# Patient Record
Sex: FEMALE | Race: WHITE | Employment: OTHER | URBAN - METROPOLITAN AREA
[De-identification: names, ages, dates, MRNs, and addresses within clinical notes are randomized per-mention and may not be internally consistent; named-entity substitution may affect disease eponyms.]

---

## 2017-10-23 ENCOUNTER — THERAPY VISIT (OUTPATIENT)
Dept: PHYSICAL THERAPY | Facility: CLINIC | Age: 77
End: 2017-10-23
Payer: MEDICARE

## 2017-10-23 DIAGNOSIS — M25.551 HIP PAIN, RIGHT: Primary | ICD-10-CM

## 2017-10-23 PROCEDURE — 97161 PT EVAL LOW COMPLEX 20 MIN: CPT | Mod: GP | Performed by: PHYSICAL THERAPIST

## 2017-10-23 PROCEDURE — G8981 BODY POS CURRENT STATUS: HCPCS | Mod: GP | Performed by: PHYSICAL THERAPIST

## 2017-10-23 PROCEDURE — 97110 THERAPEUTIC EXERCISES: CPT | Mod: GP | Performed by: PHYSICAL THERAPIST

## 2017-10-23 PROCEDURE — G8982 BODY POS GOAL STATUS: HCPCS | Mod: GP | Performed by: PHYSICAL THERAPIST

## 2017-10-23 PROCEDURE — 97112 NEUROMUSCULAR REEDUCATION: CPT | Mod: GP | Performed by: PHYSICAL THERAPIST

## 2017-10-23 NOTE — PROGRESS NOTES
[facility] Initial Evaluation -- Lower Extremity    Evaluation Date: October 23, 2017  Tamela Mora is a 76 year old female with a (R) hip condition.   Referral: GP  Work mechanical stresses: NA   Employment status: Retired  Leisure mechanical stresses: Walking (daily), gym (5-6 x week), Has not done either the past couple weeks  Functional disability score: NA  VAS score (0-10): 5/10  Patient goals/expectations:  Reduce pain and allow return to exercise activities.    HISTORY:    Present symptoms: (R) posterior hip/glut, posterior thigh  Pain quality (sharp/shooting/stabbing/aching/burning/cramping):  dull    Present since (onset date): Several months.  MD referral date 10.18.17   Symptoms (improving/unchaning/worsening):  unchanging.      Symptoms commenced as a result of: No apparent reason   Condition occurred in the following environment: NA     Symptoms at onset: (R) posterior hip  Paresthesia (yes/no):  No  Spinal history: No     Cough/Sneeze (pos/neg):  Neg    Constant symptoms: None  Intermittent symptoms: As above    Symptoms are worse with the following: Sometimes Bending, Always Sitting, Sometimes Rising, Sometimes Walking (prolonged), Sometimes Stairs, Always Sleeping (prone/sup/side R/L) - Sides and Time of day - Always PM   Symptoms are better with the following: Sometimes walking    Continued use makes the pain (better/worse/no effect): Worse    Disturbed night (yes/no): Yes, 1 x night      Pain at rest (yes/no):  Yes  Site (back/hip/knee/ankle/foot):  (R) posterior hip    Other questions (swelling/clicking/locking/giving way/falling):  None     Previous episodes: No  Previous treatments: None    Specific Questions:  General health (excellent/good/fair/poor):  Good  Pertinent medical history includes: Cancer, High blood pressure and Thyroid problems  Medications (nil/NSAIDS/analg/steroids/anticoag/other):  Other - Thyroid and High blood pressure  Medical allergies:   None  Imaging (none/Xray/MRI/other):  X-ray (Hip DJD)  Recent or major surgery (yes/no):  (B) Thumb, Thyroidectomy  Night pain (yes/no):  No  Accidents (yes/no):  No  Unexplained weight loss (yes/no):  No  Barriers at home: None  Other red flags: None    Sites for physical examination (back/hip/knee/ankle/foot/other): Back, Hip    EXAMINATION    Posture:  Sitting (good/fair/poor): Fair    Correction of Posture (better/worse/no effect/NA): No effect  Standing (good/fair/poor): Fair  Other observations:  NA    Neurological: (NA/motor/sensory/reflexes/dural): Neg slump (B)    Baselines (pain or functional activity): Painful sitting, lunging, prolonged walking, going up stairs    Extremities (Hip / Knee / Ankle / Foot): Hip    Movement Loss Angel Mod Min Nil Pain   NT due to assessment of L/S          Passive Movement (+/- over pressure)/(PDM/ERP):  NT  Resisted Test Response (pain): NT  Other Tests: NT    Spine:  Movement loss: Flex WNL; Ext Mod-Angel loss, ERP (R) ant hip; SG Mod loss (B), ERP (B)  Effect of repeated movements: FIS - NE, NE, NE.  FISit - NE, NE, Pain worse with lunge after, NE ROM.  EIS - Produce ant thigh, NW, NE  Effect of static positioning: NT  Spine testing (not relevant/relevant/secondary problem): Relevant, assessing Ext with time to further confirm.    Baseline Symptoms: NT  Repeated Tests Symptom Response Mechanical Response   Active/Passive movement, resisted test, functional test During -  Produce, Abolish, Increase, Decrease, NE After -  Better, Worse, NB, NW, NE Effect -   ? or ? ROM, strength or key functional test No   Effect   NT due to testing lumbar spine       Effect of static positioning       NT         Provisional Classification (Extremity/Spine):  Spine - Inconclusive/Other - Testing repeated EIS over the next week.      Princicple of Management:   Education:  Spine v Hip involvement, specificity of exercise, posture    Equipment provided:  Lumbar roll  Exercise and dosage:  EIS x  10-15 reps every 2 hrs    ASSESSMENT/PLAN:    Patient is a 76 year old female with right side hip complaints.    Patient has the following significant findings with corresponding treatment plan.                Diagnosis 1:  (R) Hip Pain with lumbar component    Pain -  hot/cold therapy, self management, education, directional preference exercise and home program  Decreased ROM/flexibility - manual therapy, therapeutic exercise and home program  Decreased joint mobility - manual therapy, therapeutic exercise and home program  Decreased strength - therapeutic exercise, therapeutic activities and home program  Impaired gait - gait training and home program  Impaired muscle performance - neuro re-education and home program  Decreased function - therapeutic activities and home program  Impaired posture - neuro re-education and home program    Therapy Evaluation Codes:   1) History comprised of:   Personal factors that impact the plan of care:      None.    Comorbidity factors that impact the plan of care are:      High blood pressure.     Medications impacting care: High blood pressure.  2) Examination of Body Systems comprised of:   Body structures and functions that impact the plan of care:      Lumbar spine.   Activity limitations that impact the plan of care are:      Bending, Sitting, Squatting/kneeling, Stairs and Sleeping.  3) Clinical presentation characteristics are:   Stable/Uncomplicated.  4) Decision-Making    Low complexity using standardized patient assessment instrument and/or measureable assessment of functional outcome.  Cumulative Therapy Evaluation is: Low complexity.    Previous and current functional limitations:  (See Goal Flow Sheet for this information)    Short term and Long term goals: (See Goal Flow Sheet for this information)     Communication ability:  Patient appears to be able to clearly communicate and understand verbal and written communication and follow directions correctly.  Treatment  Explanation - The following has been discussed with the patient:   RX ordered/plan of care  Anticipated outcomes  Possible risks and side effects  This patient would benefit from PT intervention to resume normal activities.   Rehab potential is good.    Frequency:  1 X week, once daily  Duration:  for 6 weeks  Discharge Plan:  Achieve all LTG.  Independent in home treatment program.  Reach maximal therapeutic benefit.    Please refer to the daily flowsheet for treatment today, total treatment time and time spent performing 1:1 timed codes.

## 2017-10-23 NOTE — LETTER
DEPARTMENT OF HEALTH AND HUMAN SERVICES  [facility] FOR [facility] & MEDICAID SERVICES    PLAN/UPDATED PLAN OF PROGRESS FOR OUTPATIENT REHABILITATION    PATIENTS NAME:  Tamela Mora   : 1940    PROVIDER NUMBER:    [id number]  [facility]  [id number]    PROVIDER NAME: [facility]  MEDICAL RECORD NUMBER: [id number]     START OF CARE DATE:  SOC Date: 10/23/17   TYPE:  PT    PRIMARY/TREATMENT DIAGNOSIS: (Pertinent Medical Diagnosis)  Hip pain, right    VISITS FROM START OF CARE:    1     [facility] Initial Evaluation -- Lower Extremity  Evaluation Date: 2017  Tamela Mora is a 76 year old female with a (R) hip condition.   Referral: GP  Work mechanical stresses: NA   Employment status: Retired  Leisure mechanical stresses: Walking (daily), gym (5-6 x week), Has not done either the past couple weeks  Functional disability score: NA  VAS score (0-10): 5/10  Patient goals/expectations:  Reduce pain and allow return to exercise activities.    HISTORY:  Present symptoms: (R) posterior hip/glut, posterior thigh  Pain quality (sharp/shooting/stabbing/aching/burning/cramping):  dull  Present since (onset date): Several months.  MD referral date 10.18.17   Symptoms (improving/unchaning/worsening):  unchanging.    Symptoms commenced as a result of: No apparent reason   Condition occurred in the following environment: NA   Symptoms at onset: (R) posterior hip    Paresthesia (yes/no):  No  Spinal history: No       Cough/Sneeze (pos/neg):  Neg  Constant symptoms: None  Intermittent symptoms: As above  Symptoms are worse with the following: Sometimes Bending, Always Sitting, Sometimes Rising, Sometimes Walking (prolonged), Sometimes Stairs, Always Sleeping (prone/sup/side R/L) - Sides and Time of day - Always PM   Symptoms are better with the following: Sometimes walking  Continued use makes the pain (better/worse/no effect): Worse  Disturbed night  (yes/no): Yes, 1 x night        PATIENTS NAME:  Tamela Mora   : 1940  PRIMARY/TREATMENT DIAGNOSIS: (Pertinent Medical Diagnosis)  Hip pain, right    Pain at rest (yes/no):  Yes    Site (back/hip/knee/ankle/foot):  (R) posterior hip  Other questions (swelling/clicking/locking/giving way/falling):  None  Previous episodes: No  Previous treatments: None    Specific Questions:  General health (excellent/good/fair/poor):  Good  Pertinent medical history includes: Cancer, High blood pressure and Thyroid problems  Medications (nil/NSAIDS/analg/steroids/anticoag/other):  Other - Thyroid and High blood pressure  Medical allergies:  None  Imaging (none/Xray/MRI/other):  X-ray (Hip DJD)  Recent or major surgery (yes/no):  (B) Thumb, Thyroidectomy  Night pain (yes/no):  No  Accidents (yes/no):  No  Unexplained weight loss (yes/no):  No  Barriers at home: None  Other red flags: None    Sites for physical examination (back/hip/knee/ankle/foot/other): Back, Hip    EXAMINATION    Posture:  Sitting (good/fair/poor): Fair    Correction of Posture (better/worse/no effect/NA): No effect  Standing (good/fair/poor): Fair  Other observations:  NA    Neurological: (NA/motor/sensory/reflexes/dural): Neg slump (B)    Baselines (pain or functional activity): Painful sitting, lunging, prolonged walking, going up stairs    Extremities (Hip / Knee / Ankle / Foot): Hip    Movement Loss Angel Mod Min Nil Pain   NT due to assessment of L/S          Passive Movement (+/- over pressure)/(PDM/ERP):  NT  Resisted Test Response (pain): NT  Other Tests: NT      PATIENTS NAME:  Tamela Mora   : 1940  PRIMARY/TREATMENT DIAGNOSIS: (Pertinent Medical Diagnosis)  Hip pain, right    Spine:  Movement loss: Flex WNL; Ext Mod-Angel loss, ERP (R) ant hip; SG Mod loss (B), ERP (B)  Effect of repeated movements: FIS - NE, NE, NE.  FISit - NE, NE, Pain worse with lunge after, NE ROM.  EIS - Produce ant thigh, NW, NE  Effect of static positioning:  NT  Spine testing (not relevant/relevant/secondary problem): Relevant, assessing Ext with time to further confirm.    Baseline Symptoms: NT  Repeated Tests Symptom Response Mechanical Response   Active/Passive movement, resisted test, functional test During -  Produce, Abolish, Increase, Decrease, NE After -  Better, Worse, NB, NW, NE Effect -   ? or ? ROM, strength or key functional test No   Effect   NT due to testing lumbar spine       Effect of static positioning       NT         Provisional Classification (Extremity/Spine):  Spine - Inconclusive/Other - Testing repeated EIS over the next week.    Princicple of Management:   Education:  Spine v Hip involvement, specificity of exercise, posture    Equipment provided:  Lumbar roll  Exercise and dosage:  EIS x 10-15 reps every 2 hrs    ASSESSMENT/PLAN:  Patient is a 76 year old female with right side hip complaints.    Patient has the following significant findings with corresponding treatment plan.                Diagnosis 1:  (R) Hip Pain with lumbar component  Pain -  hot/cold therapy, self management, education, directional preference exercise and home program  Decreased ROM/flexibility - manual therapy, therapeutic exercise and home program  Decreased joint mobility - manual therapy, therapeutic exercise and home program  Decreased strength - therapeutic exercise, therapeutic activities and home program  Impaired gait - gait training and home program  Impaired muscle performance - neuro re-education and home program  Decreased function - therapeutic activities and home program  Impaired posture - neuro re-education and home program          PATIENTS NAME:  Tamela Mora   : 1940  PRIMARY/TREATMENT DIAGNOSIS: (Pertinent Medical Diagnosis)  Hip pain, right    Therapy Evaluation Codes:   1) History comprised of:   Personal factors that impact the plan of care:      None.    Comorbidity factors that impact the plan of care are:      High blood pressure.      Medications impacting care: High blood pressure.  2) Examination of Body Systems comprised of:   Body structures and functions that impact the plan of care:      Lumbar spine.   Activity limitations that impact the plan of care are:      Bending, Sitting, Squatting/kneeling, Stairs and Sleeping.  3) Clinical presentation characteristics are:   Stable/Uncomplicated.  4) Decision-Making    Low complexity using standardized patient assessment instrument and/or measureable assessment of functional outcome.  Cumulative Therapy Evaluation is: Low complexity.    Previous and current functional limitations:  (See Goal Flow Sheet for this information)    Short term and Long term goals: (See Goal Flow Sheet for this information)   Communication ability:  Patient appears to be able to clearly communicate and understand verbal and written communication and follow directions correctly.  Treatment Explanation - The following has been discussed with the patient:   RX ordered/plan of care, Anticipated outcomes, Possible risks and side effects                                                PATIENTS NAME:  Tamela Mora   : 1940  PRIMARY/TREATMENT DIAGNOSIS: (Pertinent Medical Diagnosis)  Hip pain, right    This patient would benefit from PT intervention to resume normal activities.   Rehab potential is good.  Frequency:  1 X week, once daily  Duration:  for 6 weeks  Discharge Plan:  Achieve all LTG.  Independent in home treatment program.  Reach maximal therapeutic benefit.        Caregiver Signature/Credentials _____________________________ Date ________         Leandro Gonzalez DPT, Cert MDT     I have reviewed and certified the need for these services and plan of treatment while under my care.        PHYSICIAN'S SIGNATURE:   _________________________________________    Date___________   Elyssa Smith PA-C    Certification period:  Beginning of Cert date period: 10/23/17 to  18     Functional Level Progress Report:  "Please see attached \"Goal Flow sheet for Functional level.\"    ____X____ Continue Services or       ________ DC Services                Service dates: From  SOC Date: 10/23/17  to present                         "

## 2017-10-24 PROBLEM — M25.551 HIP PAIN, RIGHT: Status: ACTIVE | Noted: 2017-10-24

## 2017-10-31 ENCOUNTER — THERAPY VISIT (OUTPATIENT)
Dept: PHYSICAL THERAPY | Facility: CLINIC | Age: 77
End: 2017-10-31
Payer: MEDICARE

## 2017-10-31 DIAGNOSIS — M25.551 HIP PAIN, RIGHT: ICD-10-CM

## 2017-10-31 PROCEDURE — 97110 THERAPEUTIC EXERCISES: CPT | Mod: GP | Performed by: PHYSICAL THERAPIST

## 2017-11-07 ENCOUNTER — THERAPY VISIT (OUTPATIENT)
Dept: PHYSICAL THERAPY | Facility: CLINIC | Age: 77
End: 2017-11-07
Payer: MEDICARE

## 2017-11-07 DIAGNOSIS — M25.551 HIP PAIN, RIGHT: ICD-10-CM

## 2017-11-07 PROCEDURE — 97110 THERAPEUTIC EXERCISES: CPT | Mod: GP | Performed by: PHYSICAL THERAPIST

## 2017-11-15 ENCOUNTER — THERAPY VISIT (OUTPATIENT)
Dept: PHYSICAL THERAPY | Facility: CLINIC | Age: 77
End: 2017-11-15
Payer: MEDICARE

## 2017-11-15 DIAGNOSIS — M25.551 HIP PAIN, RIGHT: ICD-10-CM

## 2017-11-15 PROCEDURE — 97110 THERAPEUTIC EXERCISES: CPT | Mod: GP | Performed by: PHYSICAL THERAPIST

## 2017-11-29 ENCOUNTER — THERAPY VISIT (OUTPATIENT)
Dept: PHYSICAL THERAPY | Facility: CLINIC | Age: 77
End: 2017-11-29
Payer: MEDICARE

## 2017-11-29 DIAGNOSIS — M25.551 HIP PAIN, RIGHT: ICD-10-CM

## 2017-11-29 PROCEDURE — G8982 BODY POS GOAL STATUS: HCPCS | Mod: GP | Performed by: PHYSICAL THERAPIST

## 2017-11-29 PROCEDURE — 97110 THERAPEUTIC EXERCISES: CPT | Mod: GP | Performed by: PHYSICAL THERAPIST

## 2017-11-29 PROCEDURE — G8983 BODY POS D/C STATUS: HCPCS | Mod: GP | Performed by: PHYSICAL THERAPIST

## 2017-11-29 NOTE — PROGRESS NOTES
Subjective:    HPI                    Objective:    System    Physical Exam    General     ROS    Assessment/Plan:      DISCHARGE REPORT    Progress reporting period is from 10.23.2017 to 11.29.2017.       SUBJECTIVE  Subjective: Pt reports she is compliant with prone lying in extension, EIS and hip Ext has been less often. Patient is woken up ~3x/week because of (R) hip pain. Pt is able to fall back asleep with reposition and ibprofen.    Current Pain level: 0/10.     Initial Pain level: 5/10.   Changes in function:  Yes (See Goal flowsheet attached for changes in current functional level)  Adverse reaction to treatment or activity: None    OBJECTIVE  Objective: ROM: Flex: 25% loss, Ext: 25% loss. L SG 0% loss, Stretching feeling in (R) Lat Hip. (R) SG 0% loss     ASSESSMENT/PLAN  Updated problem list and treatment plan: Diagnosis 1:  (R) Hip pain with lumbar component  Decreased ROM/flexibility - home program  Decreased joint mobility - home program  Decreased function - home program  STG/LTGs have been met or progress has been made towards goals:  Yes (See Goal flow sheet completed today.)  Assessment of Progress: The patient's condition is improving.  Patient is meeting short term goals and is progressing towards long term goals.  Self Management Plans:  Patient is independent in a home treatment program.  Patient is independent in self management of symptoms.  I have re-evaluated this patient and find that the nature, scope, duration and intensity of the therapy is appropriate for the medical condition of the patient.  Tamela continues to require the following intervention to meet STG and LTG's:  PT    Recommendations:  This patient is ready to be discharged from therapy and continue their home treatment program.    Please refer to the daily flowsheet for treatment today, total treatment time and time spent performing 1:1 timed codes.

## 2017-11-29 NOTE — LETTER
[facility]IC [facility] PHYSICAL THERAPY  [address]  [facility] [address]  [phone]    2017    Re: Tamela Mora   :   1940  MRN:  [id number]   REFERRING PHYSICIAN:   Elyssa Smith    [facility] PHYSICAL THERAPY    Date of Initial Evaluation:  10/23/2017  Visits:    5  Reason for Referral:  Hip pain, right    DISCHARGE REPORT:  Progress reporting period is from 10.23.2017 to 2017.      SUBJECTIVE  Subjective: Pt reports she is compliant with prone lying in extension, EIS and hip Ext has been less often. Patient is woken up ~3x/week because of (R) hip pain. Pt is able to fall back asleep with reposition and ibprofen.    Current Pain level: 0/10.     Initial Pain level: 5/10.   Changes in function:  Yes (See Goal flowsheet attached for changes in current functional level)  Adverse reaction to treatment or activity: None    OBJECTIVE  Objective: ROM: Flex: 25% loss, Ext: 25% loss. L SG 0% loss, Stretching feeling in (R) Lat Hip. (R) SG 0% loss     ASSESSMENT/PLAN  Updated problem list and treatment plan: Diagnosis 1:  (R) Hip pain with lumbar component  Decreased ROM/flexibility - home program  Decreased joint mobility - home program  Decreased function - home program  STG/LTGs have been met or progress has been made towards goals:  Yes (See Goal flow sheet completed today.)  Assessment of Progress: The patient's condition is improving.  Patient is meeting short term goals and is progressing towards long term goals.  Self Management Plans:  Patient is independent in a home treatment program.  Patient is independent in self management of symptoms.  I have re-evaluated this patient and find that the nature, scope, duration and intensity of the therapy is appropriate for the medical condition of the patient.  Tamela continues to require the following intervention to meet STG and LTG's:  PT              Re: Tamela Mora    :   1940    Recommendations:  This patient is ready to be discharged from therapy and continue their home treatment program.    Thank you for your referral.    INQUIRIES        Therapist:  Leandro Gonzalez DPT, cert MDT  [facility] PH[facility]ERAP[facility]  [address] [facility] [address]  [facility] [address]  Phone: [phone]  Fax: [phone]

## 2017-12-18 ENCOUNTER — THERAPY VISIT (OUTPATIENT)
Dept: PHYSICAL THERAPY | Facility: CLINIC | Age: 77
End: 2017-12-18
Payer: MEDICARE

## 2017-12-18 DIAGNOSIS — M25.551 HIP PAIN, RIGHT: ICD-10-CM

## 2017-12-18 PROCEDURE — G8982 BODY POS GOAL STATUS: HCPCS | Mod: GP | Performed by: PHYSICAL THERAPIST

## 2017-12-18 PROCEDURE — G8983 BODY POS D/C STATUS: HCPCS | Mod: GP | Performed by: PHYSICAL THERAPIST

## 2017-12-18 PROCEDURE — 97110 THERAPEUTIC EXERCISES: CPT | Mod: GP | Performed by: PHYSICAL THERAPIST

## 2017-12-18 NOTE — LETTER
[facility] ATHLETIC [facility] PHYSICAL THERAPY  [address]  [facility] [address]  [phone]    2017    Re: Tamela Mora   :   1940  MRN:  [id number]   REFERRING PHYSICIAN:   Elyssa Smith    [facility]THLET[facility] PHYSICAL THERAPY    Date of Initial Evaluation:  10/23/2017  Visits:   6  Reason for Referral:  Hip pain, right    PROGRESS  REPORT:  Progress reporting period is from 17 to 17.       SUBJECTIVE  Subjective changes noted by patient:  Pt returns to PT stating she is continuing to have pain in (R) ant/lat hip with prolonged sitting, sometimes walking, and going up/down stairs.  Has been getting a little worse the past few weeks but relates she has not been as consistent with standing extension stretch as she was early on.  Also reports the  at the club suggested to check her hip?.    Current Pain level: 0/10.   Initial Pain level: 5/10.   Changes in function:  Yes (See Goal flowsheet attached for changes in current functional level).  Adverse reaction to treatment or activity: None    OBJECTIVE  L/S AROM:  Flex WNL; Ext Min-Mod loss; SG min loss (B)/ERP.  PROM Hip:  (R) Flex min loss/ERP; ER Min loss/ERP; IR Min loss/ERP, Ext mod loss/ERP     ASSESSMENT/PLAN  Updated problem list and treatment plan: Diagnosis 1:  (R) Hip Pain  Pain -  self management, education, directional preference exercise and home program  Decreased ROM/flexibility - therapeutic exercise and home program  Decreased joint mobility - therapeutic exercise and home program  Impaired muscle performance - neuro re-education and home program  Decreased function - therapeutic activities and home program  STG/LTGs have been met or progress has been made towards goals:  Yes (See Goal flow sheet completed today.)  Assessment of Progress: The patient's progress has slowed.  Self Management Plans:  Patient has been instructed in a home treatment  program.  Patient  has been instructed in self management of symptoms.  I have re-evaluated this patient and find that the nature, scope, duration and intensity of the therapy is appropriate for the medical condition of the patient.  Tamela continues to require the following intervention to meet STG and LTG's:  PT intervention is no longer required to meet STG/LTG.          Re: Tamela Mora   :   1940    Recommendations:  Continue with HEP as instructed with increased frequency of lumbar EIS over the next 2 weeks.  If no significant change as a result recommend follow up with MD.    Thank you for your referral.    INQUIRIES        Therapist:  Leandro Gonzalez, DPT, cert MDT   [facility]  [address] [facility] [address]  [facility] [address]  Phone: [phone]  Fax: [phone]

## 2017-12-19 NOTE — PROGRESS NOTES
[facility] Evaluation  Subjective:    HPI                    Objective:    System    Physical Exam    General     ROS    Assessment/Plan:      PROGRESS  REPORT    Progress reporting period is from 11.29.17 to 12.19.17.       SUBJECTIVE  Subjective changes noted by patient:  Pt returns to PT stating she is continuing to have pain in (R) ant/lat hip with prolonged sitting, sometimes walking, and going up/down stairs.  Has been getting a little worse the past few weeks but relates she has not been as consistent with standing extension stretch as she was early on.  Also reports the  at the club suggested to check her hip?.    Current Pain level: 0/10.     Initial Pain level: 5/10.   Changes in function:  Yes (See Goal flowsheet attached for changes in current functional level)  Adverse reaction to treatment or activity: None    OBJECTIVE  L/S AROM:  Flex WNL; Ext Min-Mod loss; SG min loss (B)/ERP.  PROM Hip:  (R) Flex min loss/ERP; ER Min loss/ERP; IR Min loss/ERP, Ext mod loss/ERP     ASSESSMENT/PLAN  Updated problem list and treatment plan: Diagnosis 1:  (R) Hip Pain    Pain -  self management, education, directional preference exercise and home program  Decreased ROM/flexibility - therapeutic exercise and home program  Decreased joint mobility - therapeutic exercise and home program  Impaired muscle performance - neuro re-education and home program  Decreased function - therapeutic activities and home program  STG/LTGs have been met or progress has been made towards goals:  Yes (See Goal flow sheet completed today.)  Assessment of Progress: The patient's progress has slowed.  Self Management Plans:  Patient has been instructed in a home treatment program.  Patient  has been instructed in self management of symptoms.  I have re-evaluated this patient and find that the nature, scope, duration and intensity of the therapy is appropriate for the medical condition of the patient.  Tamela  continues to require the following intervention to meet STG and LTG's:  PT intervention is no longer required to meet STG/LTG.    Recommendations:  Continue with HEP as instructed with increased frequency of lumbar EIS over the next 2 weeks.  If no significant change as a result recommend follow up with MD.    Please refer to the daily flowsheet for treatment today, total treatment time and time spent performing 1:1 timed codes.

## 2018-02-19 PROBLEM — M25.551 HIP PAIN, RIGHT: Status: RESOLVED | Noted: 2017-10-24 | Resolved: 2018-02-19

## 2018-02-19 NOTE — PROGRESS NOTES
D/C PT.  Patient did not return to PT after last visit.  See progress note below to serve as D/C status.

## 2018-05-09 ENCOUNTER — THERAPY VISIT (OUTPATIENT)
Dept: PHYSICAL THERAPY | Facility: CLINIC | Age: 78
End: 2018-05-09
Payer: MEDICARE

## 2018-05-09 DIAGNOSIS — M25.522 PAIN IN JOINT INVOLVING UPPER ARM, LEFT: Primary | ICD-10-CM

## 2018-05-09 PROCEDURE — 97530 THERAPEUTIC ACTIVITIES: CPT | Mod: GP | Performed by: PHYSICAL THERAPIST

## 2018-05-09 PROCEDURE — G8985 CARRY GOAL STATUS: HCPCS | Mod: GP | Performed by: PHYSICAL THERAPIST

## 2018-05-09 PROCEDURE — 97161 PT EVAL LOW COMPLEX 20 MIN: CPT | Mod: GP | Performed by: PHYSICAL THERAPIST

## 2018-05-09 PROCEDURE — 97110 THERAPEUTIC EXERCISES: CPT | Mod: GP | Performed by: PHYSICAL THERAPIST

## 2018-05-09 PROCEDURE — G8984 CARRY CURRENT STATUS: HCPCS | Mod: GP | Performed by: PHYSICAL THERAPIST

## 2018-05-09 NOTE — LETTER
DEPARTMENT OF HEALTH AND HUMAN SERVICES  CENTERS FOR MEDICARE & MEDICAID SERVICES    PLAN/UPDATED PLAN OF PROGRESS FOR OUTPATIENT REHABILITATION    PATIENTS NAME:  Tamela Mora   : 1940    PROVIDER NUMBER:    [id number]  [id number]     PROVIDER NAME: [facility]  MEDICAL RECORD NUMBER: [id number]     START OF CARE DATE:  SOC Date: 18   TYPE:  PT    PRIMARY/TREATMENT DIAGNOSIS: (Pertinent Medical Diagnosis)  Pain in joint involving upper arm, left  VISITS FROM START OF CARE:   1   [facility] Initial Evaluation -- Upper Extremity  Evaluation Date: May 9, 2018  Tamela Mora is a 77 year old female with a (L) arm condition.   Referral: GP  Work mechanical stresses: NA  Employment status:  Retired  Leisure mechanical stresses: No formal exercise  Functional disability score (SPADI): Pt did not complete  VAS score (0-10): 3/10  Handedness (R/L):  Right  Patient goals/expectations:  What's causing pain and how to fix it    HISTORY  Present symptoms: (L) lateral arm to elbow.    Pain quality (sharp/shooting/stabbing/aching/burning/cramping):  burning  Present since (onset date):  5-6 weeks.  MD referral date 18.    Symptoms (improving/unchanging/worsening):  unchanging.  Symptoms commenced as a result of: No apparent reason   Condition occurred in the following environment: Home  Symptoms at onset: As above    Paresthesia (yes/no):  No  Spinal history: No     Cough/Sneeze (pos/neg):  Neg  Constant symptoms: As above  Intermittent symptoms: None  Symptoms are worse with the following: Sometimes Dressing, Always Reaching, Always Sleeping (prone/sup/side R/L) - right side and Time of day - No effect   Symptoms are better with the following: Other - ibuprofen  Continued use makes the pain (better/worse/no effect): worse  Disturbed night (yes/no):  Yes, sometimes  Pain at rest (yes/no): Yes    Site  (neck/shoulder/elbow/wrist/hand): shoulder/arm  Other questions (swelling/catching/clicking/locking/subluxing):  None  Previous episodes: No  Previous treatments: None  PATIENTS NAME:  Tamela Mora   : 1940  PRIMARY/TREATMENT DIAGNOSIS: (Pertinent Medical Diagnosis)  Pain in joint involving upper arm, left    Specific Questions:  General health (excellent/good/fair/poor):  Good  Pertinent medical history includes: High blood pressure and Thyroid problems  Medications (nil/NSAIDS/analg/steroids/anticoag/other):  Other - Thyroid  Medical allergies:  None  Imaging (None/Xray/MRI/Other): X-rays   Recent or major surgery (yes/no): Thyroidectomy  Night pain (yes/no): No  Accidents (yes/no): No  Unexplained weight loss (yes/no): No  Barriers at home: None  Other red flags: None    Sites for physical examination (neck/shoulder/elbow/wrist/hand): neck/shoulder    EXAMINATION    Posture:  Sitting (good/fair/poor): Poor  Correction of posture (better/worse/no effect/NA): Better  Standing (good/fair/poor): Fair  Other observations:  NT  Neurological (NA/motor/sensory/reflexes/dural): NT  Baselines (pain or functional activity): Painful reaching behind back, out to side    Extremities (Shoulder/Elbow/Wrist/Hand): Shoulder    Movement Loss Angel Mod Min Nil Pain   Flexion    X    Extension    X    Abduction    X PDM   Internal Rotation    X PDM   External Rotation    X       Passive Movement (+/- overpressure)/(PDM/ERP):  NT  Resisted Test Response (pain): FF 4/5, painful; Abd 4/5, painful; IR 5/5; ER 5/5  Other Tests: NT    Spine:  Movement Loss: Flex WNL; Ext mod loss; Rotation Min loss (B).  Effect of repeated movements: Retr - NE, NE, NE; Retr/Ext - Produce BON, NW, Dec pain Abd, Improved FF/Abd strength  Effect of static positioning: NT  Spine testing (not relevant/relevant/secondary problem): Relevant      PATIENTS NAME:  Tamela Mora   : 1940  PRIMARY/TREATMENT DIAGNOSIS: (Pertinent Medical  Diagnosis)  Pain in joint involving upper arm, left    Baseline Symptoms: NT  Repeated Tests Symptom Response Mechanical Response   Active/Passive movement, resisted test, functional test During - Produce, Abolish, Increase, Decrease, NE After -   Better, Worse, NB, NW, NE Effect -   ? or ? ROM, strength or key functional test No Effect   NT due to response with repeated cervical movements       Effect of static positioning       NT         Provisional Classification (Extremity/Spine): Spine - Derangement - Asymmetrical, unilateral, symptoms above elbow      Principle of Management:  Education:  Posture, spine as source of arm sx's, centralization, specificity of exercise  Equipment provided:  None.  Use of rolled towel for posture correction  Exercise and dosage:  Retr/Ext x 10-15 reps every 2 hrs    ASSESSMENT/PLAN:  Patient is a 77 year old female with left side shoulder complaints.    Patient has the following significant findings with corresponding treatment plan.                Diagnosis 1:  (L) Arm Pain with cervical component  Pain -  self management, education, directional preference exercise and home program  Decreased strength - therapeutic exercise, therapeutic activities and home program  Impaired muscle performance - neuro re-education and home program  Decreased function - therapeutic activities and home program  Impaired posture - neuro re-education and home program                          PATIENTS NAME:  Tamela Mora   : 1940  PRIMARY/TREATMENT DIAGNOSIS: (Pertinent Medical Diagnosis)  Pain in joint involving upper arm, left    Therapy Evaluation Codes:   1) History comprised of:   Personal factors that impact the plan of care:      None.    Comorbidity factors that impact the plan of care are:      High blood pressure.     Medications impacting care: Thyroid.  2) Examination of Body Systems comprised of:   Body structures and functions that impact the plan of care:      Cervical spine  "and Shoulder.   Activity limitations that impact the plan of care are:      Sleeping and Reaching.  3) Clinical presentation characteristics are:   Stable/Uncomplicated.  4) Decision-Making    Low complexity using standardized patient assessment instrument and/or   measureable assessment of functional outcome.  Cumulative Therapy Evaluation is: Low complexity.    Previous and current functional limitations:  (See Goal Flow Sheet for this information)    Short term and Long term goals: (See Goal Flow Sheet for this information)   Communication ability:  Patient appears to be able to clearly communicate and understand verbal and written communication and follow directions correctly.  Treatment Explanation - The following has been discussed with the patient:   RX ordered/plan of care, Anticipated outcomes, Possible risks and side effects                                                  PATIENTS NAME:  Tamela Mora   : 1940  PRIMARY/TREATMENT DIAGNOSIS: (Pertinent Medical Diagnosis)  Pain in joint involving upper arm, left  This patient would benefit from PT intervention to resume normal activities.   Rehab potential is good.  Frequency:  1 X week, once daily  Duration:  for 6 weeks  Discharge Plan:  Achieve all LTG.  Independent in home treatment program.  Reach maximal therapeutic benefit.              Caregiver Signature/Credentials _____________________________ Date ________          Leandro Gonzalez DPT, Cert MDT     I have reviewed and certified the need for these services and plan of treatment while under my care.        PHYSICIAN'S SIGNATURE:   _________________________________________    Date___________   Elyssa Smith PA-C      Certification period:  Beginning of Cert date period: 18 to  18     Functional Level Progress Report: Please see attached \"Goal Flow sheet for Functional level.\"    ____X____ Continue Services or       ________ DC Services                Service dates: From  SOC Date: " 05/09/18  to present

## 2018-05-09 NOTE — PROGRESS NOTES
[facility] Initial Evaluation -- Upper Extremity    Evaluation Date: May 9, 2018  Tamela Mora is a 77 year old female with a (L) arm condition.   Referral: GP  Work mechanical stresses: NA  Employment status:  Retired  Leisure mechanical stresses: No formal exercise  Functional disability score (SPADI): Pt did not complete  VAS score (0-10): 3/10  Handedness (R/L):  Right  Patient goals/expectations:  What's causing pain and how to fix it    HISTORY    Present symptoms: (L) lateral arm to elbow.    Pain quality (sharp/shooting/stabbing/aching/burning/cramping):  burning    Present since (onset date):  5-6 weeks.  MD referral date 4/27/18.    Symptoms (improving/unchanging/worsening):  unchanging.    Symptoms commenced as a result of: No apparent reason   Condition occurred in the following environment: Home    Symptoms at onset: As above  Paresthesia (yes/no):  No  Spinal history: No   Cough/Sneeze (pos/neg):  Neg    Constant symptoms: As above  Intermittent symptoms: None    Symptoms are worse with the following: Sometimes Dressing, Always Reaching, Always Sleeping (prone/sup/side R/L) - right side and Time of day - No effect   Symptoms are better with the following: Other - ibuprofen    Continued use makes the pain (better/worse/no effect): worse    Disturbed night (yes/no):  Yes, sometimes    Pain at rest (yes/no): Yes  Site (neck/shoulder/elbow/wrist/hand): shoulder/arm    Other questions (swelling/catching/clicking/locking/subluxing):  None    Previous episodes: No  Previous treatments: None    Specific Questions:  General health (excellent/good/fair/poor):  Good  Pertinent medical history includes: High blood pressure and Thyroid problems  Medications (nil/NSAIDS/analg/steroids/anticoag/other):  Other - Thyroid  Medical allergies:  None  Imaging (None/Xray/MRI/Other): X-rays   Recent or major surgery (yes/no): Thyroidectomy  Night pain (yes/no): No  Accidents (yes/no): No  Unexplained  weight loss (yes/no): No  Barriers at home: None  Other red flags: None    Sites for physical examination (neck/shoulder/elbow/wrist/hand): neck/shoulder    EXAMINATION    Posture:  Sitting (good/fair/poor): Poor  Correction of posture (better/worse/no effect/NA): Better  Standing (good/fair/poor): Fair  Other observations:  NT    Neurological (NA/motor/sensory/reflexes/dural): NT    Baselines (pain or functional activity): Painful reaching behind back, out to side    Extremities (Shoulder/Elbow/Wrist/Hand): Shoulder    Movement Loss Angel Mod Min Nil Pain   Flexion    X    Extension    X    Abduction    X PDM   Internal Rotation    X PDM   External Rotation    X       Passive Movement (+/- overpressure)/(PDM/ERP):  NT  Resisted Test Response (pain): FF 4/5, painful; Abd 4/5, painful; IR 5/5; ER 5/5  Other Tests: NT    Spine:  Movement Loss: Flex WNL; Ext mod loss; Rotation Min loss (B).  Effect of repeated movements: Retr - NE, NE, NE; Retr/Ext - Produce BON, NW, Dec pain Abd, Improved FF/Abd strength  Effect of static positioning: NT  Spine testing (not relevant/relevant/secondary problem): Relevant    Baseline Symptoms: NT  Repeated Tests Symptom Response Mechanical Response   Active/Passive movement, resisted test, functional test During - Produce, Abolish, Increase, Decrease, NE After -   Better, Worse, NB, NW, NE Effect -   ? or ? ROM, strength or key functional test No Effect   NT due to response with repeated cervical movements       Effect of static positioning       NT           Provisional Classification (Extremity/Spine): Spine - Derangement - Asymmetrical, unilateral, symptoms above elbow      Principle of Management:  Education:  Posture, spine as source of arm sx's, centralization, specificity of exercise  Equipment provided:  None.  Use of rolled towel for posture correction  Exercise and dosage:  Retr/Ext x 10-15 reps every 2 hrs    ASSESSMENT/PLAN:    Patient is a 77 year old female with left side  shoulder complaints.    Patient has the following significant findings with corresponding treatment plan.                Diagnosis 1:  (L) Arm Pain with cervical component    Pain -  self management, education, directional preference exercise and home program  Decreased strength - therapeutic exercise, therapeutic activities and home program  Impaired muscle performance - neuro re-education and home program  Decreased function - therapeutic activities and home program  Impaired posture - neuro re-education and home program    Therapy Evaluation Codes:   1) History comprised of:   Personal factors that impact the plan of care:      None.    Comorbidity factors that impact the plan of care are:      High blood pressure.     Medications impacting care: Thyroid.  2) Examination of Body Systems comprised of:   Body structures and functions that impact the plan of care:      Cervical spine and Shoulder.   Activity limitations that impact the plan of care are:      Sleeping and Reaching.  3) Clinical presentation characteristics are:   Stable/Uncomplicated.  4) Decision-Making    Low complexity using standardized patient assessment instrument and/or measureable assessment of functional outcome.  Cumulative Therapy Evaluation is: Low complexity.    Previous and current functional limitations:  (See Goal Flow Sheet for this information)    Short term and Long term goals: (See Goal Flow Sheet for this information)     Communication ability:  Patient appears to be able to clearly communicate and understand verbal and written communication and follow directions correctly.  Treatment Explanation - The following has been discussed with the patient:   RX ordered/plan of care  Anticipated outcomes  Possible risks and side effects  This patient would benefit from PT intervention to resume normal activities.   Rehab potential is good.    Frequency:  1 X week, once daily  Duration:  for 6 weeks  Discharge Plan:  Achieve all  LTG.  Independent in home treatment program.  Reach maximal therapeutic benefit.    Please refer to the daily flowsheet for treatment today, total treatment time and time spent performing 1:1 timed codes.

## 2018-05-16 ENCOUNTER — THERAPY VISIT (OUTPATIENT)
Dept: PHYSICAL THERAPY | Facility: CLINIC | Age: 78
End: 2018-05-16
Payer: MEDICARE

## 2018-05-16 DIAGNOSIS — M25.522 PAIN IN JOINT INVOLVING UPPER ARM, LEFT: ICD-10-CM

## 2018-05-16 PROCEDURE — 97530 THERAPEUTIC ACTIVITIES: CPT | Mod: GP | Performed by: PHYSICAL THERAPIST

## 2018-05-16 PROCEDURE — 97110 THERAPEUTIC EXERCISES: CPT | Mod: GP | Performed by: PHYSICAL THERAPIST

## 2020-02-17 RX ORDER — LEVOTHYROXINE SODIUM 100 UG/1
100 TABLET ORAL DAILY
COMMUNITY

## 2020-02-17 RX ORDER — SIMVASTATIN 10 MG
10 TABLET ORAL AT BEDTIME
COMMUNITY

## 2020-02-17 RX ORDER — CETIRIZINE HYDROCHLORIDE 10 MG/1
10 TABLET ORAL DAILY
COMMUNITY

## 2020-02-17 RX ORDER — AMLODIPINE BESYLATE 10 MG/1
10 TABLET ORAL DAILY
COMMUNITY

## 2020-02-18 ENCOUNTER — ANESTHESIA (OUTPATIENT)
Dept: SURGERY | Facility: CLINIC | Age: 80
End: 2020-02-18
Payer: COMMERCIAL

## 2020-02-18 ENCOUNTER — ANESTHESIA EVENT (OUTPATIENT)
Dept: SURGERY | Facility: CLINIC | Age: 80
End: 2020-02-18
Payer: COMMERCIAL

## 2020-02-18 ENCOUNTER — HOSPITAL ENCOUNTER (OUTPATIENT)
Facility: CLINIC | Age: 80
Discharge: HOME | End: 2020-02-18
Attending: OPHTHALMOLOGY | Admitting: OPHTHALMOLOGY
Payer: COMMERCIAL

## 2020-02-18 VITALS
RESPIRATION RATE: 16 BRPM | WEIGHT: 156 LBS | TEMPERATURE: 98.2 F | SYSTOLIC BLOOD PRESSURE: 141 MMHG | OXYGEN SATURATION: 94 % | HEART RATE: 80 BPM | BODY MASS INDEX: 25.99 KG/M2 | HEIGHT: 65 IN | DIASTOLIC BLOOD PRESSURE: 79 MMHG

## 2020-02-18 PROCEDURE — 71000012 ZZH RECOVERY PHASE 1 LEVEL 1 FIRST HR: Performed by: OPHTHALMOLOGY

## 2020-02-18 PROCEDURE — 88305 TISSUE EXAM BY PATHOLOGIST: CPT | Mod: 26,59 | Performed by: OPHTHALMOLOGY

## 2020-02-18 PROCEDURE — 88331 PATH CONSLTJ SURG 1 BLK 1SPC: CPT | Performed by: OPHTHALMOLOGY

## 2020-02-18 PROCEDURE — 71000027 ZZH RECOVERY PHASE 2 EACH 15 MINS: Performed by: OPHTHALMOLOGY

## 2020-02-18 PROCEDURE — 37000008 ZZH ANESTHESIA TECHNICAL FEE, 1ST 30 MIN: Performed by: OPHTHALMOLOGY

## 2020-02-18 PROCEDURE — 36000056 ZZH SURGERY LEVEL 3 1ST 30 MIN: Performed by: OPHTHALMOLOGY

## 2020-02-18 PROCEDURE — 36000058 ZZH SURGERY LEVEL 3 EA 15 ADDTL MIN: Performed by: OPHTHALMOLOGY

## 2020-02-18 PROCEDURE — 25000132 ZZH RX MED GY IP 250 OP 250 PS 637: Performed by: OPHTHALMOLOGY

## 2020-02-18 PROCEDURE — 88331 PATH CONSLTJ SURG 1 BLK 1SPC: CPT | Mod: 26,59 | Performed by: OPHTHALMOLOGY

## 2020-02-18 PROCEDURE — 25000128 H RX IP 250 OP 636: Performed by: ANESTHESIOLOGY

## 2020-02-18 PROCEDURE — 25000125 ZZHC RX 250: Performed by: OPHTHALMOLOGY

## 2020-02-18 PROCEDURE — 40000170 ZZH STATISTIC PRE-PROCEDURE ASSESSMENT II: Performed by: OPHTHALMOLOGY

## 2020-02-18 PROCEDURE — 25000125 ZZHC RX 250: Performed by: NURSE ANESTHETIST, CERTIFIED REGISTERED

## 2020-02-18 PROCEDURE — 25800030 ZZH RX IP 258 OP 636: Performed by: NURSE ANESTHETIST, CERTIFIED REGISTERED

## 2020-02-18 PROCEDURE — 88305 TISSUE EXAM BY PATHOLOGIST: CPT | Performed by: OPHTHALMOLOGY

## 2020-02-18 PROCEDURE — 27210794 ZZH OR GENERAL SUPPLY STERILE: Performed by: OPHTHALMOLOGY

## 2020-02-18 PROCEDURE — 25000128 H RX IP 250 OP 636: Performed by: NURSE ANESTHETIST, CERTIFIED REGISTERED

## 2020-02-18 PROCEDURE — 37000009 ZZH ANESTHESIA TECHNICAL FEE, EACH ADDTL 15 MIN: Performed by: OPHTHALMOLOGY

## 2020-02-18 RX ORDER — LIDOCAINE HCL/EPINEPHRINE/PF 2%-1:200K
VIAL (ML) INJECTION
Status: DISCONTINUED
Start: 2020-02-18 | End: 2020-02-18 | Stop reason: HOSPADM

## 2020-02-18 RX ORDER — NALOXONE HYDROCHLORIDE 0.4 MG/ML
.1-.4 INJECTION, SOLUTION INTRAMUSCULAR; INTRAVENOUS; SUBCUTANEOUS
Status: DISCONTINUED | OUTPATIENT
Start: 2020-02-18 | End: 2020-02-18 | Stop reason: HOSPADM

## 2020-02-18 RX ORDER — SODIUM CHLORIDE, SODIUM LACTATE, POTASSIUM CHLORIDE, CALCIUM CHLORIDE 600; 310; 30; 20 MG/100ML; MG/100ML; MG/100ML; MG/100ML
INJECTION, SOLUTION INTRAVENOUS CONTINUOUS PRN
Status: DISCONTINUED | OUTPATIENT
Start: 2020-02-18 | End: 2020-02-18

## 2020-02-18 RX ORDER — MEPERIDINE HYDROCHLORIDE 25 MG/ML
12.5 INJECTION INTRAMUSCULAR; INTRAVENOUS; SUBCUTANEOUS
Status: DISCONTINUED | OUTPATIENT
Start: 2020-02-18 | End: 2020-02-18 | Stop reason: HOSPADM

## 2020-02-18 RX ORDER — ERYTHROMYCIN 5 MG/G
OINTMENT OPHTHALMIC PRN
Status: DISCONTINUED | OUTPATIENT
Start: 2020-02-18 | End: 2020-02-18 | Stop reason: HOSPADM

## 2020-02-18 RX ORDER — ONDANSETRON 2 MG/ML
INJECTION INTRAMUSCULAR; INTRAVENOUS PRN
Status: DISCONTINUED | OUTPATIENT
Start: 2020-02-18 | End: 2020-02-18

## 2020-02-18 RX ORDER — ONDANSETRON 2 MG/ML
4 INJECTION INTRAMUSCULAR; INTRAVENOUS EVERY 30 MIN PRN
Status: DISCONTINUED | OUTPATIENT
Start: 2020-02-18 | End: 2020-02-18 | Stop reason: HOSPADM

## 2020-02-18 RX ORDER — HYDROMORPHONE HYDROCHLORIDE 1 MG/ML
.3-.5 INJECTION, SOLUTION INTRAMUSCULAR; INTRAVENOUS; SUBCUTANEOUS EVERY 10 MIN PRN
Status: DISCONTINUED | OUTPATIENT
Start: 2020-02-18 | End: 2020-02-18 | Stop reason: HOSPADM

## 2020-02-18 RX ORDER — FENTANYL CITRATE 0.05 MG/ML
25-50 INJECTION, SOLUTION INTRAMUSCULAR; INTRAVENOUS
Status: DISCONTINUED | OUTPATIENT
Start: 2020-02-18 | End: 2020-02-18 | Stop reason: HOSPADM

## 2020-02-18 RX ORDER — LIDOCAINE HYDROCHLORIDE 20 MG/ML
INJECTION, SOLUTION INFILTRATION; PERINEURAL PRN
Status: DISCONTINUED | OUTPATIENT
Start: 2020-02-18 | End: 2020-02-18

## 2020-02-18 RX ORDER — LIDOCAINE HCL/EPINEPHRINE/PF 2%-1:200K
VIAL (ML) INJECTION PRN
Status: DISCONTINUED | OUTPATIENT
Start: 2020-02-18 | End: 2020-02-18 | Stop reason: HOSPADM

## 2020-02-18 RX ORDER — TETRACAINE HYDROCHLORIDE 5 MG/ML
SOLUTION OPHTHALMIC
Status: DISCONTINUED
Start: 2020-02-18 | End: 2020-02-18 | Stop reason: HOSPADM

## 2020-02-18 RX ORDER — ONDANSETRON 4 MG/1
4 TABLET, ORALLY DISINTEGRATING ORAL EVERY 30 MIN PRN
Status: DISCONTINUED | OUTPATIENT
Start: 2020-02-18 | End: 2020-02-18 | Stop reason: HOSPADM

## 2020-02-18 RX ORDER — ERYTHROMYCIN 5 MG/G
OINTMENT OPHTHALMIC
Status: DISCONTINUED
Start: 2020-02-18 | End: 2020-02-18 | Stop reason: HOSPADM

## 2020-02-18 RX ORDER — TETRACAINE HYDROCHLORIDE 5 MG/ML
SOLUTION OPHTHALMIC PRN
Status: DISCONTINUED | OUTPATIENT
Start: 2020-02-18 | End: 2020-02-18 | Stop reason: HOSPADM

## 2020-02-18 RX ORDER — HYDROCODONE BITARTRATE AND ACETAMINOPHEN 5; 325 MG/1; MG/1
1 TABLET ORAL ONCE
Status: COMPLETED | OUTPATIENT
Start: 2020-02-18 | End: 2020-02-18

## 2020-02-18 RX ORDER — PROPOFOL 10 MG/ML
INJECTION, EMULSION INTRAVENOUS PRN
Status: DISCONTINUED | OUTPATIENT
Start: 2020-02-18 | End: 2020-02-18

## 2020-02-18 RX ORDER — SODIUM CHLORIDE, SODIUM LACTATE, POTASSIUM CHLORIDE, CALCIUM CHLORIDE 600; 310; 30; 20 MG/100ML; MG/100ML; MG/100ML; MG/100ML
INJECTION, SOLUTION INTRAVENOUS CONTINUOUS
Status: DISCONTINUED | OUTPATIENT
Start: 2020-02-18 | End: 2020-02-18 | Stop reason: HOSPADM

## 2020-02-18 RX ADMIN — DEXMEDETOMIDINE HYDROCHLORIDE 8 MCG: 100 INJECTION, SOLUTION INTRAVENOUS at 07:58

## 2020-02-18 RX ADMIN — PROPOFOL 40 MG: 10 INJECTION, EMULSION INTRAVENOUS at 07:35

## 2020-02-18 RX ADMIN — SODIUM CHLORIDE, POTASSIUM CHLORIDE, SODIUM LACTATE AND CALCIUM CHLORIDE: 600; 310; 30; 20 INJECTION, SOLUTION INTRAVENOUS at 07:30

## 2020-02-18 RX ADMIN — FENTANYL CITRATE 25 MCG: 0.05 INJECTION, SOLUTION INTRAMUSCULAR; INTRAVENOUS at 08:40

## 2020-02-18 RX ADMIN — FENTANYL CITRATE 25 MCG: 0.05 INJECTION, SOLUTION INTRAMUSCULAR; INTRAVENOUS at 08:49

## 2020-02-18 RX ADMIN — LIDOCAINE HYDROCHLORIDE 40 MG: 20 INJECTION, SOLUTION INFILTRATION; PERINEURAL at 07:35

## 2020-02-18 RX ADMIN — HYDROCODONE BITARTRATE AND ACETAMINOPHEN 1 TABLET: 5; 325 TABLET ORAL at 09:10

## 2020-02-18 RX ADMIN — ONDANSETRON 4 MG: 2 INJECTION INTRAMUSCULAR; INTRAVENOUS at 07:35

## 2020-02-18 ASSESSMENT — ENCOUNTER SYMPTOMS
SEIZURES: 0
DYSRHYTHMIAS: 0

## 2020-02-18 ASSESSMENT — COPD QUESTIONNAIRES: COPD: 0

## 2020-02-18 ASSESSMENT — LIFESTYLE VARIABLES: TOBACCO_USE: 1

## 2020-02-18 ASSESSMENT — MIFFLIN-ST. JEOR: SCORE: 1183.49

## 2020-02-18 NOTE — OP NOTE
Procedure Date: 02/18/2020      PROCEDURES:   1.  Full-thickness excision lesion lateral right lower eyelid.   2.  Full-thickness excision lateral right upper eyelid.      PREOPERATIVE DIAGNOSIS:  Basal cell carcinoma, right lateral canthus.      SURGEON:  Kali Whitaker MD      ANESTHESIA:  Local, monitored.      COMPLICATIONS:  None.      INDICATIONS FOR PROCEDURE:  The patient had a lesion excised in the right lateral canthus elsewhere.  The margins were positive for residual basal cell carcinoma.  The defect healed and there was no longer a discreet lesion to identify.  Therefore, the patient presents for excision of the area of previous surgery with margin control.  This resulted in a full-thickness excision of the lateral 28% of the lower eyelid and the lateral 10% of the upper eyelid.       DESCRIPTION OF PROCEDURE:  The area was injected with 2% lidocaine with 1:100,000 epinephrine.  A full-thickness incision was made in the lower eyelid medial to the apparent clinical involvement.  An incision was also made in the lateral canthus and the intervening tissue was excised.  A full-thickness margin was now taken from the medial aspect of the defect in the lower eyelid and from the medial aspect of the defect in the upper eyelid.  These were submitted for frozen section control.  It was not possible to reapproximate the tarsus to the orbital rim because of the tension on the closure; therefore, a periosteal flap was created in the lateral canthus.  This was now used to secure the lateral tarsus with interrupted 5-0 Vicryl suture.  Two sutures were placed and then the skin was closed with 6-0 nylon and 6-0 chromic suture in the lower and upper eyelids.  Tincture of benzoin was applied and Steri-Strips were placed to take tension off of the closure.  All margins proved to be negative for residual basal cell carcinoma.  The patient tolerated the procedure well.         KALI WHITAKER MD             D: 02/18/2020   T:  2020   MT: RONDA      Name:     MALIA BRADLEY   MRN:      [id number]        Account:        [id number]   :      1940           Procedure Date: 2020      Document: [id number]

## 2020-02-18 NOTE — DISCHARGE INSTRUCTIONS
Same Day Surgery Discharge Instructions for  Sedation and General Anesthesia       It's not unusual to feel dizzy, light-headed or faint for up to 24 hours after surgery or while taking pain medication.  If you have these symptoms: sit for a few minutes before standing and have someone assist you when you get up to walk or use the bathroom.      You should rest and relax for the next 24 hours. We recommend you make arrangements to have an adult stay with you for at least 24 hours after your discharge.  Avoid hazardous and strenuous activity.      DO NOT DRIVE any vehicle or operate mechanical equipment for 24 hours following the end of your surgery.  Even though you may feel normal, your reactions may be affected by the medication you have received.      Do not drink alcoholic beverages for 24 hours following surgery.       Slowly progress to your regular diet as you feel able. It's not unusual to feel nauseated and/or vomit after receiving anesthesia.  If you develop these symptoms, drink clear liquids (apple juice, ginger ale, broth, 7-up, etc. ) until you feel better.  If your nausea and vomiting persists for 24 hours, please notify your surgeon.        All narcotic pain medications, along with inactivity and anesthesia, can cause constipation. Drinking plenty of liquids and increasing fiber intake will help.      For any questions of a medical nature, call your surgeon.      Do not make important decisions for 24 hours.      If you had general anesthesia, you may have a sore throat for a couple of days related to the breathing tube used during surgery.  You may use Cepacol lozenges to help with this discomfort.  If it worsens or if you develop a fever, contact your surgeon.       If you feel your pain is not well managed with the pain medications prescribed by your surgeon, please contact your surgeon's office to let them know so they can address your concerns.       [facility]   Eyelid/Orbital  Surgery Discharge Instructions    Kali Whitaker M.D..     ICE COMPRESSES  Immediately following surgery, you should begin to apply ice compresses.  Apply a cold gel pack or wrap a clean washcloth around a cup of crushed ice in a plastic bag (a bag of frozen peas also works well) and hold the cold compresses directly against the closed eyelid (s).Apply cold pack for a minimum of six times daily for no longer than 15 minutes at a time. Continue cold compresses every day until the bruising and swelling begin to subside.  This can vary for each patient, but three 3 days may be common.    HOT COMPRESSES  After your swelling and bruising have begun to subside, hot compresses should be applied.  Take a clean washcloth and wring it out in hot water (as warm as you can tolerate comfortably).  Hold this warm compress against the closed eyelid(s) at least six times per day for 15 minutes.  This should be continued for about two weeks.    OINTMENT  You may be given some ointment when you leave the hospital.  Apply this ointment to the suture line(s) twice a day, 1/4 inch into the eye at bedtime for 7 days.  Expect some blurring of vision from the ointment.     ACTIVITY  Avoid heavy lifting or vigorous exercise for one week after surgery.  You may resume regular activities as tolerated.  You may shower and wash your hair on the day after surgery; be careful to avoid getting shampoo in your eyes. While your eyes are still swelling, it is recommended you sleep on your back and elevate your head with 2-3 pillows.    MEDICATION  If the doctor has given you some medications to take after surgery, please take these according to the instructions on the bottle.  Pain medications may make you drowsy so do not drive, operate heavy machinery, or use alcohol while taking it.  When you feel that you do not need the prescription pain medication, you may substitute Extra Strength Tylenol for mild pain by also following the directions on the  bottle.    If you were taking Coumadin (warfarin) prior to your surgery, you may resume this medication with your next scheduled dose.    WHAT TO EXPECT  You should expect some slight oozing of blood from the incision site over the first two to three days after surgery.  Swelling and bruising will occur for one to two weeks or longer.  You may also experience itching and tearing during the first several weeks after surgery.  This is part of the normal healing process    QUESTIONS  Please feel free to contact the office, should you have any questions that are not answered above.  The phone number is [phone].  Please call immediately if you are unable to establish vision in the operative eye, you are experiencing heavy bleeding that will not stop with gentle pressure or you have any signs of an infection (greenish/yellow discharge or progressive redness).    [facility]  [place] [address]  [place], [place] [address]  [phone]

## 2020-02-18 NOTE — ANESTHESIA CARE TRANSFER NOTE
Patient: Tamela Mora    Procedure(s):  EXCISION RIGHT LATERAL CANTHUS LESION WITH FROZEN SECTION FULL THICKNESS RIGHT LOWER EYELID AND RIGHT UPPER EYELID    Diagnosis: Neoplasm of uncertain behavior of skin [D48.5]  Diagnosis Additional Information: No value filed.    Anesthesia Type:   MAC     Note:  Airway :Room Air  Patient transferred to:PACU  Comments: At end of procedure, spontaneous respirations, patient alert to voice, able to follow commands. Patient breathing room air at room air to PACU. Oxygen tubing connected to wall O2 in PACU, SpO2, NiBP, and EKG monitors and alarms on and functioning, Lucia Hugger warmer connected to patient gown, report on patient's clinical status given to PACU RN, RN questions answered.Handoff Report: Identifed the Patient, Identified the Reponsible Provider, Reviewed the pertinent medical history, Discussed the surgical course, Reviewed Intra-OP anesthesia mangement and issues during anesthesia, Set expectations for post-procedure period and Allowed opportunity for questions and acknowledgement of understanding      Vitals: (Last set prior to Anesthesia Care Transfer)    CRNA VITALS  2/18/2020 0758 - 2/18/2020 0833      2/18/2020             Resp Rate (set):  10                Electronically Signed By: SHEBA Thibodeaux CRNA  February 18, 2020  8:33 AM

## 2020-02-18 NOTE — ANESTHESIA PREPROCEDURE EVALUATION
Anesthesia Pre-Procedure Evaluation    Patient: Tamela Mora   MRN: [id number] : 1940          Preoperative Diagnosis: Neoplasm of uncertain behavior of skin [D48.5]    Procedure(s):  EXCISION RIGHT LATERAL CANTHUS LESION WITH FROZEN SECTION    Past Medical History:   Diagnosis Date     Hypertension      Thyroid disease      Past Surgical History:   Procedure Laterality Date     APPENDECTOMY       ENT SURGERY      CATARACT     THYROIDECTOMY        TONSIL AND ADENOIDECTOMY         Anesthesia Evaluation     . Pt has had prior anesthetic.     No history of anesthetic complications          ROS/MED HX    ENT/Pulmonary:     (+)tobacco use, Past use , . .   (-) asthma, COPD and recent URI   Neurologic:      (-) seizures, CVA and migraines   Cardiovascular:     (+) Dyslipidemia, hypertension----. : . . . :. .      (-) arrhythmias and irregular heartbeat/palpitations   METS/Exercise Tolerance:     Hematologic:         Musculoskeletal:         GI/Hepatic: Comment: Noonan's esophagus    (+) GERD Asymptomatic on medication,      (-) liver disease   Renal/Genitourinary:      (-) renal disease   Endo:     (+) thyroid problem hypothyroidism, .   (-) Type II DM   Psychiatric:         Infectious Disease:         Malignancy:   (+) Malignancy   Basal cell carcinoma right eyelid        Other:                          Physical Exam  Normal systems: cardiovascular, pulmonary and dental    Airway   Mallampati: II  TM distance: >3 FB  Neck ROM: full    Dental     Cardiovascular       Pulmonary             No results found for: WBC, HGB, HCT, PLT, CRP, SED, NA, POTASSIUM, CHLORIDE, CO2, BUN, CR, GLC, GREGORIO, PHOS, MAG, ALBUMIN, PROTTOTAL, ALT, AST, GGT, ALKPHOS, BILITOTAL, BILIDIRECT, LIPASE, AMYLASE, CHRIS, PTT, INR, FIBR, TSH, T4, T3, HCG, HCGS, CKTOTAL, CKMB, TROPN    Preop Vitals  BP Readings from Last 3 Encounters:   20 (!) 145/72    Pulse Readings from Last 3 Encounters:   No data found for Pulse      Resp Readings from  "Last 3 Encounters:   02/18/20 16    SpO2 Readings from Last 3 Encounters:   02/18/20 97%      Temp Readings from Last 1 Encounters:   02/18/20 35.8  C (96.4  F) (Oral)    Ht Readings from Last 1 Encounters:   02/18/20 1.651 m (5' 5\")      Wt Readings from Last 1 Encounters:   02/18/20 70.8 kg (156 lb)    Estimated body mass index is 25.96 kg/m  as calculated from the following:    Height as of this encounter: 1.651 m (5' 5\").    Weight as of this encounter: 70.8 kg (156 lb).       Anesthesia Plan      History & Physical Review  History and physical reviewed and following examination; no interval change.    ASA Status:  3 .    NPO Status:  > 8 hours    Plan for MAC Reason for MAC:  Procedure to face, neck, head or breast  PONV prophylaxis:  Ondansetron (or other 5HT-3)       Postoperative Care      Consents  Anesthetic plan, risks, benefits and alternatives discussed with:  Patient..                 Sahil Rosenthal,   "

## 2020-02-18 NOTE — ANESTHESIA POSTPROCEDURE EVALUATION
Patient: Tamela Mora    Procedure(s):  EXCISION RIGHT LATERAL CANTHUS LESION WITH FROZEN SECTION FULL THICKNESS RIGHT LOWER EYELID AND RIGHT UPPER EYELID    Diagnosis:Neoplasm of uncertain behavior of skin [D48.5]  Diagnosis Additional Information: No value filed.    Anesthesia Type:  MAC    Note:  Anesthesia Post Evaluation    Patient location during evaluation: PACU  Patient participation: Able to fully participate in evaluation  Level of consciousness: awake and alert  Pain management: adequate  Airway patency: patent  Cardiovascular status: acceptable and hemodynamically stable  Respiratory status: acceptable and unassisted  Hydration status: acceptable  PONV: none             Last vitals:  Vitals:    02/18/20 0845 02/18/20 0900 02/18/20 0915   BP: (!) 141/72 (!) 142/80 (!) 152/89   Pulse: 72 67 80   Resp: 14 12 11   Temp: 36.8  C (98.2  F)     SpO2: 98% 92% 93%         Electronically Signed By: Sahil Rosenthal DO  February 18, 2020  10:48 AM

## 2020-02-18 NOTE — OP NOTE
[facility] Ophthalmology Brief Operative Note    Pre-operative diagnosis: Neoplasm of uncertain behavior of skin [D48.5]   Post-operative diagnosis: Same   Procedure: Full thickness excision right lateral lower eyelid, right lateral upper eyelid   Surgeon: Kali Whitaker MD       Anesthesia: MAC   Estimated blood loss: Less than 10 ml   Total IV fluids: (See anesthesia record)   Blood transfusion: No transfusion was given during surgery   Total urine output: (See anesthesia record)   Drains: None   Specimens: None   Implants: None   Findings: See operative note   Complications: None   Condition: Stable   Comments: See dictated operative report for full details     Kali Whitaker MD

## 2020-02-19 LAB — COPATH REPORT: NORMAL

## (undated) DEVICE — SU PROLENE 5-0 RB-2DA 30" 8710H

## (undated) DEVICE — SU CHROMIC 6-0 G-1 18" 790G

## (undated) DEVICE — SUCTION CANISTER MEDIVAC LINER 3000ML W/LID 65651-530

## (undated) DEVICE — ESU PENCIL W/SMOKE EVAC E2515HS

## (undated) DEVICE — DRSG STERI STRIP 1/4X3" R1541

## (undated) DEVICE — LINEN TOWEL PACK X5 5464

## (undated) DEVICE — SU VICRYL 5-0 P-3 18" UND J493G

## (undated) DEVICE — EYE PREP BETADINE 5% SOLUTION 30ML 0065-0411-30

## (undated) DEVICE — PACK OCULOPLATIC SEN15OCFSD

## (undated) DEVICE — TUBING SUCTION 12"X1/4" N612

## (undated) DEVICE — APPLICATOR COTTON TIP 3" PKG OF 10 34831010

## (undated) DEVICE — SOL ADH LIQUID BENZOIN SWAB 0.6ML C1544

## (undated) DEVICE — SU ETHILON 6-0 P-1 18" 697G

## (undated) DEVICE — ESU ELEC NDL 1" E1552

## (undated) DEVICE — NDL ANGIOCATH 20GA 1.25" 4056

## (undated) DEVICE — GLOVE PROTEXIS W/NEU-THERA 8.5  2D73TE85

## (undated) DEVICE — PEN MARKING SKIN FINE 31145942

## (undated) DEVICE — SOL WATER IRRIG 1000ML BOTTLE 2F7114

## (undated) RX ORDER — ONDANSETRON 2 MG/ML
INJECTION INTRAMUSCULAR; INTRAVENOUS
Status: DISPENSED
Start: 2020-02-18

## (undated) RX ORDER — FENTANYL CITRATE 50 UG/ML
INJECTION, SOLUTION INTRAMUSCULAR; INTRAVENOUS
Status: DISPENSED
Start: 2020-02-18

## (undated) RX ORDER — FENTANYL CITRATE 0.05 MG/ML
INJECTION, SOLUTION INTRAMUSCULAR; INTRAVENOUS
Status: DISPENSED
Start: 2020-02-18

## (undated) RX ORDER — HYDROCODONE BITARTRATE AND ACETAMINOPHEN 5; 325 MG/1; MG/1
TABLET ORAL
Status: DISPENSED
Start: 2020-02-18

## (undated) RX ORDER — PROPOFOL 10 MG/ML
INJECTION, EMULSION INTRAVENOUS
Status: DISPENSED
Start: 2020-02-18

## (undated) RX ORDER — LIDOCAINE HYDROCHLORIDE 20 MG/ML
INJECTION, SOLUTION EPIDURAL; INFILTRATION; INTRACAUDAL; PERINEURAL
Status: DISPENSED
Start: 2020-02-18